# Patient Record
(demographics unavailable — no encounter records)

---

## 2025-05-19 NOTE — REASON FOR VISIT
[Follow-Up] : a follow-up visit for [Murmurs] : a murmur [Patent Ductus Arteriosus] : a patent ductus arteriosus [Parents] : parents

## 2025-05-19 NOTE — PHYSICAL EXAM
[General Appearance - Alert] : alert [General Appearance - In No Acute Distress] : in no acute distress [General Appearance - Well Nourished] : well nourished [General Appearance - Well Developed] : well developed [General Appearance - Well-Appearing] : well appearing [Appearance Of Head] : the head was normocephalic [Facies] : there were no dysmorphic facial features [Sclera] : the conjunctiva were normal [PERRL With Normal Accommodation] : the pupils were equal in size, round, and reactive to light [Outer Ear] : the ears and nose were normal in appearance [Nasal Cavity] : the nasal mucosa was normal [Examination Of The Oral Cavity] : mucous membranes were moist and pink [Oropharynx] : the oropharynx was normal [Respiration, Rhythm And Depth] : normal respiratory rhythm and effort [Auscultation Breath Sounds / Voice Sounds] : breath sounds clear to auscultation bilaterally [No Cough] : no cough [Stridor] : no stridor was observed [Normal Chest Appearance] : the chest was normal in appearance [Apical Impulse] : quiet precordium with normal apical impulse [Heart Rate And Rhythm] : normal heart rate and rhythm [Heart Sounds] : normal S1 and S2 [Heart Sounds Gallop] : no gallops [Heart Sounds Pericardial Friction Rub] : no pericardial rub [Heart Sounds Click] : no clicks [Arterial Pulses] : normal upper and lower extremity pulses with no pulse delay [Edema] : no edema [Capillary Refill Test] : normal capillary refill [LLSB] : LLSB  [Vibratory] : vibratory [No Diastolic Murmur] : no diastolic murmur was heard [Systolic] : systolic [I] : a grade 1/6  [LUSB] : LUSB [Ejection] : ejection [Bowel Sounds] : normal bowel sounds [Abdomen Soft] : soft [Nondistended] : nondistended [Abdomen Tenderness] : non-tender [Nail Clubbing] : no clubbing  or cyanosis of the fingers [Musculoskeletal Exam: Normal Movement Of All Extremities] : normal movements of all extremities [Motor Tone] : normal muscle strength and tone [Cervical Lymph Nodes Enlarged Anterior] : The anterior cervical nodes were normal [] : no rash [Skin Lesions] : no lesions [Skin Turgor] : normal turgor [Skin Color & Pigmentation] : normal skin color and pigmentation

## 2025-05-23 NOTE — CONSULT LETTER
[Today's Date] : [unfilled] [Name] : Name: [unfilled] [] : : ~~ [Today's Date:] : [unfilled] [Dear  ___:] : Dear Dr. [unfilled]: [Consult] : I had the pleasure of evaluating your patient, [unfilled]. My full evaluation follows. [Consult - Single Provider] : Thank you very much for allowing me to participate in the care of this patient. If you have any questions, please do not hesitate to contact me. [Sincerely,] : Sincerely, [FreeTextEntry4] : Oriana Aggarwal MD [FreeTextEntry5] : [address] [FreeTextEntry6] : [place] [address] [de-identified] : Barry E. Goldberg MD, FACC, FAAP, FASE\par  [facility]\par  [facility] \par  Chief Pediatric Cardiology\par

## 2025-05-23 NOTE — CARDIOLOGY SUMMARY
[Today's Date] : [unfilled] [FreeTextEntry1] : Normal Sinus Rhythm  Normal Axis QTc 424 ms [de-identified] : 5/19/2025 [FreeTextEntry2] : Summary: 1. Normal study. 2. Normal left ventricular size, morphology and systolic function. 3. Trivial tricuspid valve regurgitation, peak systolic instantaneous gradient 9.8 mmHg. 4. No patent ductus arteriosus. 5. No pericardial effusion.  [de-identified] : 5/10/2023 [de-identified] : Her CBC is normal

## 2025-05-23 NOTE — CARDIOLOGY SUMMARY
[Today's Date] : [unfilled] [FreeTextEntry1] : Normal Sinus Rhythm  Normal Axis QTc 424 ms [de-identified] : 5/19/2025 [FreeTextEntry2] : Summary: 1. Normal study. 2. Normal left ventricular size, morphology and systolic function. 3. Trivial tricuspid valve regurgitation, peak systolic instantaneous gradient 9.8 mmHg. 4. No patent ductus arteriosus. 5. No pericardial effusion.  [de-identified] : 5/10/2023 [de-identified] : Her CBC is normal

## 2025-05-23 NOTE — CONSULT LETTER
[Today's Date] : [unfilled] [Name] : Name: [unfilled] [] : : ~~ [Today's Date:] : [unfilled] [Dear  ___:] : Dear Dr. [unfilled]: [Consult] : I had the pleasure of evaluating your patient, [unfilled]. My full evaluation follows. [Consult - Single Provider] : Thank you very much for allowing me to participate in the care of this patient. If you have any questions, please do not hesitate to contact me. [Sincerely,] : Sincerely, [FreeTextEntry4] : Oriana Aggarwal MD [FreeTextEntry5] : [address] [FreeTextEntry6] : [place] [address] [de-identified] : Barry E. Goldberg MD, FACC, FAAP, FASE\par  [facility]\par  [facility] \par  Chief Pediatric Cardiology\par

## 2025-05-23 NOTE — HISTORY OF PRESENT ILLNESS
[FreeTextEntry1] : ROSAS  presented for follow up on May 19, 2025.   She was last evaluated on May 13, 2024.  She is a 3 year old  who was originally referred for cardiology consultation due to a heart murmur. The murmur was first diagnosed during a routine pediatric visit 2 weeks prior to her initial consultation on Jul 6, 2022.  She was diagnosed with patent ductus arteriosus with continuous left to right shunt  She  has been thriving at home, has been feeding without difficulty, has been gaining weight and developing appropriately.  Parents are happy with her development and verified lack of cardiac symptoms. There has been no tachypnea, increased work of breathing, cyanosis, excessive diaphoresis, unexplained irritability, or syncope.  ROSAS was born at term after an uneventful pregnancy.  She  was discharged with her mother.  She had COVID Nov 2022.  She has been well since last visit. She was never admitted to the hospital overnight. She is followed ophthalmologist for a swelling behind the eye.  BW 7lbs 12 ounces  Mom is healthy. Dad is healthy. There are no siblings who are well. Importantly, there is no family history of recurrent syncope, premature sudden death, cardiomyopathy, arrhythmia, drowning, or unexplained accidental deaths.

## 2025-05-23 NOTE — DISCUSSION/SUMMARY
[May participate in all age-appropriate activities] : [unfilled] May participate in all age-appropriate activities. [FreeTextEntry1] : ROSAS's  workup revealed: Functional murmur  The previously noted Patent Ductus Arteriosus was not noted on today's echo. She  had the incidental finding of trivial tricuspid insufficiency. Trivial tricuspid insufficiency is a common finding, considered a physiologic variant of normal and  allowed us to calculate estimated pulmonary artery pressures as normal. She was still somewhat difficult to echo. After complex decision making which included review of detailed medical history, physical examination in addition to  review of current testing and past testing and in consideration of the indications, risks and benefits of cardiothoracic surgery and interventional cardiac procedures, I decided not to refer for any sedated testing at this time. This is subject to reconsideration at future visits.   She  does not require any restrictions from a cardiac standpoint.  She does not require antibiotic prophylaxis from a cardiac standpoint.   She should continue with her   routine pediatric care.    [Needs SBE Prophylaxis] : [unfilled] does not need bacterial endocarditis prophylaxis